# Patient Record
Sex: FEMALE | Race: OTHER
[De-identification: names, ages, dates, MRNs, and addresses within clinical notes are randomized per-mention and may not be internally consistent; named-entity substitution may affect disease eponyms.]

---

## 2018-08-29 ENCOUNTER — HOSPITAL ENCOUNTER (EMERGENCY)
Dept: HOSPITAL 56 - MW.ED | Age: 43
Discharge: HOME | End: 2018-08-29
Payer: SELF-PAY

## 2018-08-29 DIAGNOSIS — R31.21: Primary | ICD-10-CM

## 2018-08-29 LAB
CHLORIDE SERPL-SCNC: 106 MMOL/L (ref 98–107)
SODIUM SERPL-SCNC: 139 MMOL/L (ref 136–145)

## 2018-08-29 NOTE — EDM.PDOC
ED HPI GENERAL MEDICAL PROBLEM





- General


Chief Complaint: Genitourinary Problem


Stated Complaint: UNUSAL BLEEDING


Time Seen by Provider: 08/29/18 17:07





- History of Present Illness


INITIAL COMMENTS - FREE TEXT/NARRATIVE: 





HISTORY AND PHYSICAL:





History of present illness:


Patient is a 42-year-old female with no stated medical history and no GI or  

history who receives Depo-Provera and denies pregnancy and presents with 

complaints of 2 days of seeing blood in her urine when she wipes. She says she 

sees some pink tinge in the urine and she says it is not coming from her vagina 

or her rectum. She's had no fever chills nausea vomiting or diarrhea. She has 

never had a UTI or bladder infection before. She says that she is not good 

hydrating. Patient also complains of bilateral pain to her waist area which 

occurs at nighttime when she is moving around in bed but is not consistent and 

does not radiate to her lower abdomen. She has no suprapubic pain and no 

discrete flank pain.





Review of systems: 


As per history of present illness and below otherwise all systems reviewed and 

negative.





Past medical history: 


As per history of present illness and as reviewed below otherwise 

noncontributory.





Surgical history: 


As per history of present illness and as reviewed below otherwise 

noncontributory.





Social history: 


No reported history of drug or alcohol abuse.





Family history: 


As per history of present illness and as reviewed below otherwise 

noncontributory.





Physical exam:


General: Well-developed well-nourished female who is nontoxic and vital signs 

are noted by me


HEENT: Atraumatic, normocephalic, negative for conjunctival pallor or scleral 

icterus, mucous membranes moist, throat clear, neck supple, nontender, trachea 

midline.


Lungs: Clear to auscultation, breath sounds equal bilaterally, chest nontender.


Heart: S1S2, regular rate and rhythm no overt murmurs


Abdomen: Soft, nondistended, nontender. NABS Negative for costovertebral 

tenderness.


Pelvis: Stable nontender.


Genitourinary: Deferred.


Rectal: Deferred.


Extremities: Atraumatic, negative for cords or calf pain. Neurovascular 

unremarkable.


Neuro: Awake, alert, oriented. Cranial nerves II through XII unremarkable. 

Cerebellum unremarkable. Motor and sensory unremarkable throughout. Exam 

nonfocal.





Diagnostics:


UA UCG urine culture CBC CMP CT scan of the abdomen and pelvis





Therapeutics:





Patient and daughter are aware of all testing results and that it is unclear 

what is causing her to see blood on the toilet paper and the only finding we 

have is of microscopic hematuria. I will give her referral to both primary care 

and urology and have asked her to hydrate more as her urine indicates that she 

has a high specific gravity. She's currently not having any pain fevers or any 

other symptomatology I feel she is comfortable for discharge home and further 

outpatient care.


Impression: 


Hematuria by history and microscopic hematuria etiology unclear





Definitive disposition and diagnosis as appropriate pending reevaluation and 

review of above.





- Related Data


 Allergies











Allergy/AdvReac Type Severity Reaction Status Date / Time


 


No Known Allergies Allergy   Verified 08/29/18 16:52











Home Meds: 


 Home Meds





. [No Known Home Meds]  08/29/18 [History]











Past Medical History


OB/GYN History: Reports: Pregnancy





Social & Family History





- Family History


Family Medical History: Noncontributory





- Tobacco Use


Smoking Status *Q: Never Smoker





- Recreational Drug Use


Recreational Drug Use: No





ED ROS GENERAL





- Review of Systems


Review Of Systems: ROS reveals no pertinent complaints other than HPI.





ED EXAM, GENERAL





- Physical Exam


Exam: See Below (see dictation)





Course





- Vital Signs


Last Recorded V/S: 


 Last Vital Signs











Temp  36.4 C   08/29/18 17:00


 


Pulse  84   08/29/18 17:00


 


Resp  18   08/29/18 17:00


 


BP  137/85   08/29/18 17:00


 


Pulse Ox  18 L  08/29/18 17:00














- Orders/Labs/Meds


Orders: 


 Active Orders 24 hr











 Category Date Time Status


 


 Abdomen Pelvis wo Cont [CT] Stat Exams  08/29/18 17:37 Taken


 


 CULTURE URINE [RM] Stat Lab  08/29/18 17:05 Received


 


 UA W/MICROSCOPIC [URIN] Stat Lab  08/29/18 17:05 Ordered











Labs: 


 Laboratory Tests











  08/29/18 08/29/18 08/29/18 Range/Units





  17:05 17:05 17:18 


 


WBC    6.96  (4.0-11.0)  K/uL


 


RBC    4.21 L  (4.30-5.90)  M/uL


 


Hgb    13.9  (12.0-16.0)  g/dL


 


Hct    39.0  (36.0-46.0)  %


 


MCV    92.6  (80.0-98.0)  fL


 


MCH    33.0 H  (27.0-32.0)  pg


 


MCHC    35.6  (31.0-37.0)  g/dL


 


RDW Std Deviation    42.3  (28.0-62.0)  fl


 


RDW Coeff of Regine    13  (11.0-15.0)  %


 


Plt Count    174  (150-400)  K/uL


 


MPV    10.00  (7.40-12.00)  fL


 


Neut % (Auto)    66.0  (48.0-80.0)  %


 


Lymph % (Auto)    28.0  (16.0-40.0)  %


 


Mono % (Auto)    4.9  (0.0-15.0)  %


 


Eos % (Auto)    1.0  (0.0-7.0)  %


 


Baso % (Auto)    0.1  (0.0-1.5)  %


 


Neut # (Auto)    4.6  (1.4-5.7)  K/uL


 


Lymph # (Auto)    2.0  (0.6-2.4)  K/uL


 


Mono # (Auto)    0.3  (0.0-0.8)  K/uL


 


Eos # (Auto)    0.1  (0.0-0.7)  K/uL


 


Baso # (Auto)    0.0  (0.0-0.1)  K/uL


 


Nucleated RBC %    0.0  /100WBC


 


Nucleated RBCs #    0  K/uL


 


Sodium     (136-145)  mmol/L


 


Potassium     (3.5-5.1)  mmol/L


 


Chloride     ()  mmol/L


 


Carbon Dioxide     (21.0-32.0)  mmol/L


 


BUN     (7.0-18.0)  mg/dL


 


Creatinine     (0.6-1.0)  mg/dL


 


Est Cr Clr Drug Dosing     


 


Estimated GFR (MDRD)     ml/min


 


Glucose     ()  mg/dL


 


Calcium     (8.5-10.1)  mg/dL


 


Total Bilirubin     (0.2-1.0)  mg/dL


 


AST     (15-37)  IU/L


 


ALT     (14-63)  IU/L


 


Alkaline Phosphatase     ()  U/L


 


Total Protein     (6.4-8.2)  g/dL


 


Albumin     (3.4-5.0)  g/dL


 


Globulin     (2.0-3.5)  g/dL


 


Albumin/Globulin Ratio     (1.3-2.8)  


 


Urine Color  YELLOW    


 


Urine Appearance  CLEAR    


 


Urine pH  5.5    (5.0-8.0)  


 


Ur Specific Gravity  >= 1.030    (1.001-1.035)  


 


Urine Protein  NEGATIVE    (NEGATIVE)  mg/dL


 


Urine Glucose (UA)  NEGATIVE    (NEGATIVE)  mg/dL


 


Urine Ketones  NEGATIVE    (NEGATIVE)  mg/dL


 


Urine Occult Blood  LARGE H    (NEGATIVE)  


 


Urine Nitrite  NEGATIVE    (NEGATIVE)  


 


Urine Bilirubin  NEGATIVE    (NEGATIVE)  


 


Urine Urobilinogen  0.2    (<2.0)  EU/dL


 


Ur Leukocyte Esterase  NEGATIVE    (NEGATIVE)  


 


Urine RBC  1-2    (0-2/HPF)  


 


Urine WBC  0-1    (0-5/HPF)  


 


Ur Epithelial Cells  OCCASIONAL    (NONE-FEW)  


 


Urine Bacteria  RARE    (NEGATIVE)  


 


Urine HCG, Qual   NEGATIVE   (NEGATIVE)  














  08/29/18 Range/Units





  17:18 


 


WBC   (4.0-11.0)  K/uL


 


RBC   (4.30-5.90)  M/uL


 


Hgb   (12.0-16.0)  g/dL


 


Hct   (36.0-46.0)  %


 


MCV   (80.0-98.0)  fL


 


MCH   (27.0-32.0)  pg


 


MCHC   (31.0-37.0)  g/dL


 


RDW Std Deviation   (28.0-62.0)  fl


 


RDW Coeff of Regine   (11.0-15.0)  %


 


Plt Count   (150-400)  K/uL


 


MPV   (7.40-12.00)  fL


 


Neut % (Auto)   (48.0-80.0)  %


 


Lymph % (Auto)   (16.0-40.0)  %


 


Mono % (Auto)   (0.0-15.0)  %


 


Eos % (Auto)   (0.0-7.0)  %


 


Baso % (Auto)   (0.0-1.5)  %


 


Neut # (Auto)   (1.4-5.7)  K/uL


 


Lymph # (Auto)   (0.6-2.4)  K/uL


 


Mono # (Auto)   (0.0-0.8)  K/uL


 


Eos # (Auto)   (0.0-0.7)  K/uL


 


Baso # (Auto)   (0.0-0.1)  K/uL


 


Nucleated RBC %   /100WBC


 


Nucleated RBCs #   K/uL


 


Sodium  139  (136-145)  mmol/L


 


Potassium  3.3 L  (3.5-5.1)  mmol/L


 


Chloride  106  ()  mmol/L


 


Carbon Dioxide  23.9  (21.0-32.0)  mmol/L


 


BUN  12  (7.0-18.0)  mg/dL


 


Creatinine  0.5 L  (0.6-1.0)  mg/dL


 


Est Cr Clr Drug Dosing  TNP  


 


Estimated GFR (MDRD)  > 60.0  ml/min


 


Glucose  92  ()  mg/dL


 


Calcium  9.1  (8.5-10.1)  mg/dL


 


Total Bilirubin  0.3  (0.2-1.0)  mg/dL


 


AST  37  (15-37)  IU/L


 


ALT  87 H  (14-63)  IU/L


 


Alkaline Phosphatase  108  ()  U/L


 


Total Protein  8.0  (6.4-8.2)  g/dL


 


Albumin  4.0  (3.4-5.0)  g/dL


 


Globulin  4.0 H  (2.0-3.5)  g/dL


 


Albumin/Globulin Ratio  1.0 L  (1.3-2.8)  


 


Urine Color   


 


Urine Appearance   


 


Urine pH   (5.0-8.0)  


 


Ur Specific Gravity   (1.001-1.035)  


 


Urine Protein   (NEGATIVE)  mg/dL


 


Urine Glucose (UA)   (NEGATIVE)  mg/dL


 


Urine Ketones   (NEGATIVE)  mg/dL


 


Urine Occult Blood   (NEGATIVE)  


 


Urine Nitrite   (NEGATIVE)  


 


Urine Bilirubin   (NEGATIVE)  


 


Urine Urobilinogen   (<2.0)  EU/dL


 


Ur Leukocyte Esterase   (NEGATIVE)  


 


Urine RBC   (0-2/HPF)  


 


Urine WBC   (0-5/HPF)  


 


Ur Epithelial Cells   (NONE-FEW)  


 


Urine Bacteria   (NEGATIVE)  


 


Urine HCG, Qual   (NEGATIVE)  














Departure





- Departure


Time of Disposition: 18:25


Disposition: Home, Self-Care 01


Condition: Good


Clinical Impression: 


 Asymptomatic microscopic hematuria








- Discharge Information


Referrals: 


PCP,None [Primary Care Provider] - 


Forms:  ED Department Discharge


Additional Instructions: 


The following information is given to patients seen in the emergency department 

who are being discharged to home. This information is to outline your options 

for follow-up care. We provide all patients seen in our emergency department 

with a follow-up referral.





The need for follow-up, as well as the timing and circumstances, are variable 

depending upon the specifics of your emergency department visit.





If you don't have a primary care physician on staff, we will provide you with a 

referral. We always advise you to contact your personal physician following an 

emergency department visit to inform them of the circumstance of the visit and 

for follow-up with them and/or the need for any referrals to a consulting 

specialist.





The emergency department will also refer you to a specialist when appropriate. 

This referral assures that you have the opportunity for followup care with a 

specialist. All of these measure are taken in an effort to provide you with 

optimal care, which includes your followup.





Under all circumstances we always encourage you to contact your private 

physician who remains a resource for coordinating  your care. When calling for 

followup care, please make the office aware that this follow-up is from your 

recent emergency room visit. If for any reason you are refused follow-up, 

please contact the [facility] emergency 

department at [phone] and ask to speak to the emergency department 

charge nurse.





[facility] 


Primary care- Internal Medicine and Family Prctice


[address] [place] [id number]


[phone]





[facility]


Specialty Care-Urology


[address] [place] [address]


[phone]











Push hydration and please contact the clinic for further care and evaluation. 

Return to ER as needed and as discussed. Please continue to monitor the 

symptoms to see if they persist or change in any ways.





- My Orders


Last 24 Hours: 


My Active Orders





08/29/18 17:05


CULTURE URINE [RM] Stat 


UA W/MICROSCOPIC [URIN] Stat 





08/29/18 17:37


Abdomen Pelvis wo Cont [CT] Stat 














- Assessment/Plan


Last 24 Hours: 


My Active Orders





08/29/18 17:05


CULTURE URINE [RM] Stat 


UA W/MICROSCOPIC [URIN] Stat 





08/29/18 17:37


Abdomen Pelvis wo Cont [CT] Stat

## 2018-08-30 NOTE — CT
EXAM DATE: 18



PATIENT'S AGE: 42





Patient: XAVIER CARRILLO



Facility: [place], [place]

Patient ID: [id number]

Site Patient ID: [id number].

Site Accession #: [id number]

: 1975

Study: CT Abdomen/Pelvis [id number]/29/2018 5:56:57 PM

Ordering Physician: Leora Jack



Final Report: 

INDICATION: pain



CT ABDOMEN AND PELVIS WITHOUT CONTRAST



TECHNIQUE: Multidetector CT imaging was performed through the abdomen and 
pelvis without intravenous contrast administration. Coronal and sagittal 
reconstructions were generated.



COMPARISON: None.



FINDINGS:



Lower chest: Lung bases are clear.



Liver: Within normal limits. 



Gallbladder and bile ducts: No gallbladder wall thickening or calcified 
gallstones. No biliary dilation identified.



Pancreas: Unremarkable.



Spleen: Normal.



Adrenals: No nodules or masses.



Kidneys, ureters, and urinary bladder: Tiny nonobstructing stones in the lower 
poles of both kidneys. No ureteral stone identified. No hydronephrosis. No 
bladder mass or definite wall thickening.



Gastrointestinal tract: Normal caliber bowel without wall thickening. The 
appendix is normal.



Vascular structures: Normal for age.



Peritoneum: No free air, abscess, or significant free fluid. Small 1.7 x 1.1 
centimeter partially calcified nodule in the cul-de-sac region or presacral 
space to the right of midline on image 94, series 201, of questionable clinical 
significance.



Lymph nodes: No pathologically enlarged nodes identified.



Reproductive organs: No pelvic masses.



Bones: Normal for age.



IMPRESSION:

1. No acute abnormality identified. No cause for the patient`s symptoms is 
demonstrated.

2. Tiny bilateral nonobstructing intrarenal stones. 



JAMAL FARR MD

[facility]



Dictated by Hugo Farr MD @ 2018 6:15:46 PM





Dictated by: Hugo Farr MD @ 2018 18:16:01

(Electronic Signature)







Report Signed by Proxy.
[facility]

## 2019-01-23 ENCOUNTER — HOSPITAL ENCOUNTER (EMERGENCY)
Dept: HOSPITAL 56 - MW.ED | Age: 44
Discharge: HOME | End: 2019-01-23
Payer: SELF-PAY

## 2019-01-23 DIAGNOSIS — N91.2: Primary | ICD-10-CM

## 2019-01-23 NOTE — EDM.PDOC
ED HPI GENERAL MEDICAL PROBLEM





- General


Chief Complaint: OB/GYN Problem


Stated Complaint: NO MENSES IN A MONTH


Time Seen by Provider: 01/23/19 15:38


Source of Information: Reports: Patient


History Limitations: Reports: No Limitations





- History of Present Illness


INITIAL COMMENTS - FREE TEXT/NARRATIVE: 


HISTORY AND PHYSICAL:





History of present illness:


Patient is a 43-year-old female who presents to the emergency room with 

complaints of missed menses x 1 month. States she has taken a home pregnancy 

test which has been negative. She states that she had sex last month and the 

condom broke. Her main concern is pregnancy today.


She denies any fever, chills, chest pain, shortness of breath or cough. Denies 

any abdominal pain, nausea, vomiting, diarrhea, constipation or dysuria. Denies 

any vaginal discharge or concerns of STDs. She has been able to eat and drink 

appropriately.





Review of systems: 


As per history of present illness and below otherwise all systems reviewed and 

negative.





Past medical history: 


As per history of present illness and as reviewed below otherwise 

noncontributory.





Surgical history: 


As per history of present illness and as reviewed below otherwise 

noncontributory.





Social history: 


See social history for further information





Family history: 


As per history of present illness and as reviewed below otherwise 

noncontributory.





Physical exam:


General: Well-developed and well-nourished 43-year-old female. Alert and 

oriented. Nontoxic appearing and in no acute distress.


HEENT: Atraumatic, normocephalic, pupils equal and reactive bilaterally, 

negative for conjunctival pallor or scleral icterus, mucous membranes moist, 

TMs normal bilaterally, throat clear, neck supple, nontender, trachea midline. 

No drooling or trismus noted. No meningeal signs. No hot potato voice noted. 


Lungs: Clear to auscultation, breath sounds equal bilaterally, chest nontender.


Heart: S1S2, regular rate and rhythm without overt murmur


Abdomen: Soft, nondistended, nontender. Negative for masses or 

hepatosplenomegaly. Negative for costovertebral tenderness.


Pelvis: Stable nontender.


Genitourinary: Deferred.


Rectal: Deferred.


Skin: Intact, warm, dry. No lesions or rashes noted.


Extremities: Atraumatic, negative for cords or calf pain. Neurovascular 

unremarkable.


Neuro: Awake, alert, oriented. Cranial nerves II through XII unremarkable. 

Cerebellum unremarkable. Motor and sensory unremarkable throughout. Exam 

nonfocal.





Notes:


UA and urine pregnancy is negative. Encourage patient to follow up with her OB/

GYN. She voices understanding and is agreeable to plan of care. Denies any 

further questions or concerns at this time.





Diagnostics:


UA, urine pregnancy





Therapeutics:


None





Prescription:


None





Impression: 


Missed Menses





Plan:


1. Please practice safe sex. Please talk with your OBGYN if you are interested 

in other forms of birth control methods.


2. Follow-up with your primary care provider and/or the OB/GYN in the next 

week. Return to the ED as needed and as discussed.





Definitive disposition and diagnosis as appropriate pending reevaluation and 

review of above.








- Related Data


 Allergies











Allergy/AdvReac Type Severity Reaction Status Date / Time


 


No Known Allergies Allergy   Verified 01/23/19 16:00











Home Meds: 


 Home Meds





. [No Known Home Meds]  08/29/18 [History]











Past Medical History


OB/GYN History: Reports: Pregnancy





Social & Family History





- Family History


Family Medical History: Noncontributory





ED ROS GENERAL





- Review of Systems


Review Of Systems: ROS reveals no pertinent complaints other than HPI.





ED EXAM, RENAL/





- Physical Exam


Exam: See Below (See dictation)





Course





- Vital Signs


Last Recorded V/S: 


 Last Vital Signs











Temp      


 


Pulse  88   01/23/19 17:04


 


Resp  18   01/23/19 17:04


 


BP  151/95 H  01/23/19 17:04


 


Pulse Ox  98   01/23/19 17:04














- Orders/Labs/Meds


Labs: 


 Laboratory Tests











  01/23/19 01/23/19 Range/Units





  16:08 16:08 


 


Urine Color  YELLOW   


 


Urine Appearance  CLEAR   


 


Urine pH  5.5   (5.0-8.0)  


 


Ur Specific Gravity  >= 1.030   (1.001-1.035)  


 


Urine Protein  NEGATIVE   (NEGATIVE)  mg/dL


 


Urine Glucose (UA)  NEGATIVE   (NEGATIVE)  mg/dL


 


Urine Ketones  NEGATIVE   (NEGATIVE)  mg/dL


 


Urine Occult Blood  TRACE-INTACT H   (NEGATIVE)  


 


Urine Nitrite  NEGATIVE   (NEGATIVE)  


 


Urine Bilirubin  NEGATIVE   (NEGATIVE)  


 


Urine Urobilinogen  0.2   (<2.0)  EU/dL


 


Ur Leukocyte Esterase  NEGATIVE   (NEGATIVE)  


 


Urine RBC  0-3   (0-2/HPF)  


 


Urine WBC  0-1   (0-5/HPF)  


 


Ur Epithelial Cells  FEW   (NONE-FEW)  


 


Urine Bacteria  FEW   (NEGATIVE)  


 


Urine HCG, Qual   NEGATIVE  (NEGATIVE)  














Departure





- Departure


Time of Disposition: 16:35


Disposition: Home, Self-Care 01


Clinical Impression: 


 Missed menses








- Discharge Information


Instructions:  Dysfunctional Uterine Bleeding


Referrals: 


PCP,None [Primary Care Provider] - 


Forms:  ED Department Discharge


Additional Instructions: 


The following information is given to patients seen in the emergency department 

who are being discharged to home. This information is to outline your options 

for follow-up care. We provide all patients seen in our emergency department 

with a follow-up referral.





The need for follow-up, as well as the timing and circumstances, are variable 

depending upon the specifics of your emergency department visit.





If you don't have a primary care physician on staff, we will provide you with a 

referral. We always advise you to contact your personal physician following an 

emergency department visit to inform them of the circumstance of the visit and 

for follow-up with them and/or the need for any referrals to a consulting 

specialist.





The emergency department will also refer you to a specialist when appropriate. 

This referral assures that you have the opportunity for follow-up care with a 

specialist. All of these measure are taken in an effort to provide you with 

optimal care, which includes your follow-up.





Under all circumstances we always encourage you to contact your private 

physician who remains a resource for coordinating your care. When calling for 

follow-up care, please make the office aware that this follow-up is from your 

recent emergency room visit. If for any reason you are refused follow-up, 

please contact the [facility] Emergency 

Department at [phone] and asked to speak to the emergency department 

charge nurse.





[facility]


Primary Care: Women's Health


[address] [place] [address]


Phone: [phone]


Fax: [phone]





[facility]


[address] [place] [address]


Phone: [phone]


Fax: [phone]





[facility]


[address] [place] [address]


Phone: [phone]


Fax: [phone]





1. Please practice safe sex. Please talk with your OBGYN if you are interested 

in other forms of birth control methods.


2. Follow-up with your primary care provider and/or the OB/GYN in the next 

week. Return to the ED as needed and as discussed.